# Patient Record
(demographics unavailable — no encounter records)

---

## 2024-11-11 NOTE — DISCUSSION/SUMMARY
[de-identified] : Chief complaint: Right thumb pain  HPI: Patient is a 77-year-old right-hand-dominant female presents the office today for the evaluation of pain of the right thumb which manifested on 11/1/2024.  Patient reports that she was attempting to open a jar when she felt pain at the base of the right thumb.  She denies any fall, injury, or trauma.  She has been having ongoing pain which is the same or worse since onset.  She has been using a thumb spica brace which she reports helps to immobilize her thumb.  She only has pain with range of motion of the right thumb.  Denies any pain at rest.  She has been taking Tylenol with questionable relief of her discomfort.  Denies any previous surgery to the right thumb.   ROS: Positive for right thumb pain   physical examination of the right thumb:  No appreciable edema No erythema No ecchymosis Skin is intact Good range of motion in flexion and extension at the IP joint and MP joints of the thumb  good range of motion in flexion, extension, abduction, adduction at the CMC joint No appreciable laxity with ulnar or radial stress testing at the IP joint or at the MP joints of the thumb No appreciable tenderness over the distal phalanx, IP joint, proximal phalanx, MP joint, shaft of the first metacarpal Tenderness to palpation over the CMC joint No anatomic snuffbox tenderness Negative CMC grind Negative Finkelstein's Distal sensation is grossly intact to light touch Capillary refills less than 2 seconds  Three-view x-rays of the right thumb performed in the office today show no obvious acute displaced fracture, subluxation, or dislocation, CMC arthritis, STT arthritis  Assessment/plan: CMC arthritis of the right thumb, discussed natural course and history of basal joint arthritis, discussed treatment options with the patient, recommended the purchase/use of a thumb guard while active on an as-needed basis throughout the day, she can remove the thumb guard for resting, sleeping, and showering/hygiene, discussed activity modifications with the patient, offered to prescribe the patient a prescription NSAID however the patient states that she already has meloxicam at home, she can take the meloxicam on an as-needed basis as prescribed, she denies any known contraindications to NSAIDs, she can supplement with over-the-counter Tylenol on an as-needed basis, she can apply ice to the right thumb on an as-needed basis with sensory precautions, patient will be provided with a 4-week follow-up with Dr. Hsieh for repeat evaluation, she verbalized understanding of all findings in the office today, she agrees to follow-up as directed

## 2025-06-11 NOTE — ASSESSMENT
[FreeTextEntry1] : The patient has no chest pain or SOB . The patient has increased cholesterol but has an HDL >70. The patient has had occasional palpitations and has PAC's on ECG. She has high LDL but she was intolerant to statin increased LFT's.

## 2025-06-11 NOTE — HISTORY OF PRESENT ILLNESS
[FreeTextEntry1] : The patient has HTN and increased cholesterol. The patient has history of having a SDH from chiropractor manipulation. She has no chest pain or SOB. She was recently started on HCTZ for HTN. She does have increased cholesterol.  There are no Wet Read(s) to document.

## 2025-06-11 NOTE — REVIEW OF SYSTEMS
[Palpitations] : palpitations [Constipation] : constipation [Joint Pain] : joint pain [Negative] : Psychiatric [FreeTextEntry8] : urinary frequenty

## 2025-06-11 NOTE — REASON FOR VISIT
[Hypertension] : hypertension [Arrhythmia/ECG Abnorrmalities] : arrhythmia/ECG abnormalities [FreeTextEntry3] : Dr. Webb

## 2025-07-30 NOTE — REVIEW OF SYSTEMS
[Palpitations] : palpitations [Constipation] : constipation [Joint Pain] : joint pain [Negative] : Heme/Lymph [FreeTextEntry8] : urinary frequenty

## 2025-07-30 NOTE — CARDIOLOGY SUMMARY
[de-identified] : 6- NSR first degree AV block PAC 7/30/25 SR w first degree AVB  [de-identified] : 6- PSVT No clear AF .  [de-identified] : 7- Normal LV systolic function PFO with right to left shunting mild TR LVH  [de-identified] : 6- CAC score of 23 .

## 2025-07-30 NOTE — HISTORY OF PRESENT ILLNESS
[FreeTextEntry1] : The patient has HTN and increased cholesterol. The patient has history of having a SDH from chiropractor manipulation. She has no chest pain or SOB. She was recently started on HCTZ for HTN. She does have increased cholesterol.   .  7/30/25 RT she was ni the ER on 7/22 - 7/23 for acute onset left eye blindness. She was diagnosed with an acute left opthlamic stroke 2/2 to blood clot. She was admitted to [facility] and had a TTE with bubble with noted + PFO. She has no arrythmia during her admissoin recent [facility]OT noted  57-113bpm avg hr 77bpm. SVT for 13 beats. She was d/c on DAPT x 21 days and start on statin.

## 2025-07-30 NOTE — REASON FOR VISIT
[Arrhythmia/ECG Abnorrmalities] : arrhythmia/ECG abnormalities [Hypertension] : hypertension [FreeTextEntry3] : Dr. Webb

## 2025-07-30 NOTE — ASSESSMENT
[FreeTextEntry1] : The patient had loss of vision in her left eye . She was thought to have an acute stroke involving the left opthalmic artery . The patient has had a work up at [facility] . She had an echo which did show a PFO . She had had a MRI and MRA which did not show any obstruction . The patient had a EPATCH which showed PSVT buty no obvious AF  She was placed on DAPT and she has not regained sight in her left eye but has not had furhter neurological symptoms . The patietn has had tightness of her legs at night but did not have a venous doppler of legs. She has a crytogenic stroke. She was started on statins  # left eye Opthalmic stroke , Cryptogenic  # HLD  # prior transaminitis from statins  #  HDL 74 TH 81   # HTN, controlled   plan  c/w asa/plavix Will send to EP for ILM  Uncertain if she is haivng wide swings in BP will get 24 hour ambulatory BP monitor  Will not restart diuretics will most likley add ACE or ARB or Amlodipine if needed as she had electrolyte issues  Venous doppler of legs  Follow up after testing .  Check blood work done today .